# Patient Record
Sex: MALE
[De-identification: names, ages, dates, MRNs, and addresses within clinical notes are randomized per-mention and may not be internally consistent; named-entity substitution may affect disease eponyms.]

---

## 2017-05-31 PROBLEM — Z00.00 ENCOUNTER FOR PREVENTIVE HEALTH EXAMINATION: Status: ACTIVE | Noted: 2017-05-31

## 2017-06-01 ENCOUNTER — APPOINTMENT (OUTPATIENT)
Dept: GASTROENTEROLOGY | Facility: CLINIC | Age: 32
End: 2017-06-01

## 2017-06-01 ENCOUNTER — APPOINTMENT (OUTPATIENT)
Dept: SURGERY | Facility: CLINIC | Age: 32
End: 2017-06-01

## 2017-06-01 ENCOUNTER — INPATIENT (INPATIENT)
Facility: HOSPITAL | Age: 32
LOS: 1 days | Discharge: HOME | DRG: 331 | End: 2017-06-03
Payer: COMMERCIAL

## 2017-06-01 VITALS
SYSTOLIC BLOOD PRESSURE: 115 MMHG | OXYGEN SATURATION: 98 % | DIASTOLIC BLOOD PRESSURE: 72 MMHG | RESPIRATION RATE: 18 BRPM | HEART RATE: 78 BPM | TEMPERATURE: 99 F

## 2017-06-01 VITALS
DIASTOLIC BLOOD PRESSURE: 82 MMHG | TEMPERATURE: 98.9 F | SYSTOLIC BLOOD PRESSURE: 126 MMHG | WEIGHT: 186 LBS | OXYGEN SATURATION: 98 % | BODY MASS INDEX: 28.19 KG/M2 | HEIGHT: 68 IN | HEART RATE: 93 BPM

## 2017-06-01 VITALS
RESPIRATION RATE: 16 BRPM | HEIGHT: 68 IN | OXYGEN SATURATION: 99 % | DIASTOLIC BLOOD PRESSURE: 83 MMHG | SYSTOLIC BLOOD PRESSURE: 123 MMHG | TEMPERATURE: 98.5 F | BODY MASS INDEX: 28.19 KG/M2 | WEIGHT: 186 LBS | HEART RATE: 87 BPM

## 2017-06-01 DIAGNOSIS — K59.00 CONSTIPATION, UNSPECIFIED: ICD-10-CM

## 2017-06-01 DIAGNOSIS — Z98.890 OTHER SPECIFIED POSTPROCEDURAL STATES: Chronic | ICD-10-CM

## 2017-06-01 DIAGNOSIS — Z86.79 PERSONAL HISTORY OF OTHER DISEASES OF THE CIRCULATORY SYSTEM: ICD-10-CM

## 2017-06-01 DIAGNOSIS — S83.519A SPRAIN OF ANTERIOR CRUCIATE LIGAMENT OF UNSPECIFIED KNEE, INITIAL ENCOUNTER: ICD-10-CM

## 2017-06-01 DIAGNOSIS — Z87.448 PERSONAL HISTORY OF OTHER DISEASES OF URINARY SYSTEM: ICD-10-CM

## 2017-06-01 DIAGNOSIS — M54.9 DORSALGIA, UNSPECIFIED: ICD-10-CM

## 2017-06-01 DIAGNOSIS — Z80.1 FAMILY HISTORY OF MALIGNANT NEOPLASM OF TRACHEA, BRONCHUS AND LUNG: ICD-10-CM

## 2017-06-01 DIAGNOSIS — K62.89 OTHER SPECIFIED DISEASES OF ANUS AND RECTUM: ICD-10-CM

## 2017-06-01 DIAGNOSIS — K62.5 HEMORRHAGE OF ANUS AND RECTUM: ICD-10-CM

## 2017-06-01 DIAGNOSIS — Z82.49 FAMILY HISTORY OF ISCHEMIC HEART DISEASE AND OTHER DISEASES OF THE CIRCULATORY SYSTEM: ICD-10-CM

## 2017-06-01 DIAGNOSIS — R12 HEARTBURN: ICD-10-CM

## 2017-06-01 DIAGNOSIS — K64.4 RESIDUAL HEMORRHOIDAL SKIN TAGS: ICD-10-CM

## 2017-06-01 DIAGNOSIS — K64.9 UNSPECIFIED HEMORRHOIDS: ICD-10-CM

## 2017-06-01 DIAGNOSIS — Z78.9 OTHER SPECIFIED HEALTH STATUS: ICD-10-CM

## 2017-06-01 DIAGNOSIS — K64.5 PERIANAL VENOUS THROMBOSIS: ICD-10-CM

## 2017-06-01 DIAGNOSIS — K40.90 UNILATERAL INGUINAL HERNIA, W/OUT OBSTRUCTION OR GANGRENE, NOT SPECIFIED AS RECURRENT: ICD-10-CM

## 2017-06-01 LAB
ALBUMIN SERPL ELPH-MCNC: 3.8 G/DL — SIGNIFICANT CHANGE UP (ref 3.3–5)
ALP SERPL-CCNC: 44 U/L — SIGNIFICANT CHANGE UP (ref 40–120)
ALT FLD-CCNC: 17 U/L RC — SIGNIFICANT CHANGE UP (ref 10–45)
ANION GAP SERPL CALC-SCNC: 12 MMOL/L — SIGNIFICANT CHANGE UP (ref 5–17)
APTT BLD: 27.1 SEC — LOW (ref 27.5–37.4)
AST SERPL-CCNC: 33 U/L — SIGNIFICANT CHANGE UP (ref 10–40)
BASOPHILS # BLD AUTO: 0 K/UL — SIGNIFICANT CHANGE UP (ref 0–0.2)
BASOPHILS NFR BLD AUTO: 0.6 % — SIGNIFICANT CHANGE UP (ref 0–2)
BILIRUB SERPL-MCNC: 1.6 MG/DL — HIGH (ref 0.2–1.2)
BLD GP AB SCN SERPL QL: NEGATIVE — SIGNIFICANT CHANGE UP
BUN SERPL-MCNC: 23 MG/DL — SIGNIFICANT CHANGE UP (ref 7–23)
CALCIUM SERPL-MCNC: 9.1 MG/DL — SIGNIFICANT CHANGE UP (ref 8.4–10.5)
CHLORIDE SERPL-SCNC: 103 MMOL/L — SIGNIFICANT CHANGE UP (ref 96–108)
CO2 SERPL-SCNC: 26 MMOL/L — SIGNIFICANT CHANGE UP (ref 22–31)
CREAT SERPL-MCNC: 1.08 MG/DL — SIGNIFICANT CHANGE UP (ref 0.5–1.3)
EOSINOPHIL # BLD AUTO: 0.1 K/UL — SIGNIFICANT CHANGE UP (ref 0–0.5)
EOSINOPHIL NFR BLD AUTO: 1.9 % — SIGNIFICANT CHANGE UP (ref 0–6)
GLUCOSE SERPL-MCNC: 139 MG/DL — HIGH (ref 70–99)
HCT VFR BLD CALC: 40.4 % — SIGNIFICANT CHANGE UP (ref 39–50)
HGB BLD-MCNC: 13.3 G/DL — SIGNIFICANT CHANGE UP (ref 13–17)
INR BLD: 1.14 RATIO — SIGNIFICANT CHANGE UP (ref 0.88–1.16)
LYMPHOCYTES # BLD AUTO: 2.2 K/UL — SIGNIFICANT CHANGE UP (ref 1–3.3)
LYMPHOCYTES # BLD AUTO: 36 % — SIGNIFICANT CHANGE UP (ref 13–44)
MCHC RBC-ENTMCNC: 32.1 PG — SIGNIFICANT CHANGE UP (ref 27–34)
MCHC RBC-ENTMCNC: 33 GM/DL — SIGNIFICANT CHANGE UP (ref 32–36)
MCV RBC AUTO: 97.4 FL — SIGNIFICANT CHANGE UP (ref 80–100)
MONOCYTES # BLD AUTO: 0.4 K/UL — SIGNIFICANT CHANGE UP (ref 0–0.9)
MONOCYTES NFR BLD AUTO: 7.4 % — SIGNIFICANT CHANGE UP (ref 2–14)
NEUTROPHILS # BLD AUTO: 3.3 K/UL — SIGNIFICANT CHANGE UP (ref 1.8–7.4)
NEUTROPHILS NFR BLD AUTO: 54.2 % — SIGNIFICANT CHANGE UP (ref 43–77)
PLATELET # BLD AUTO: 156 K/UL — SIGNIFICANT CHANGE UP (ref 150–400)
POTASSIUM SERPL-MCNC: 3.7 MMOL/L — SIGNIFICANT CHANGE UP (ref 3.5–5.3)
POTASSIUM SERPL-SCNC: 3.7 MMOL/L — SIGNIFICANT CHANGE UP (ref 3.5–5.3)
PROT SERPL-MCNC: 6.2 G/DL — SIGNIFICANT CHANGE UP (ref 6–8.3)
PROTHROM AB SERPL-ACNC: 12.3 SEC — SIGNIFICANT CHANGE UP (ref 9.8–12.7)
RBC # BLD: 4.14 M/UL — LOW (ref 4.2–5.8)
RBC # FLD: 12.2 % — SIGNIFICANT CHANGE UP (ref 10.3–14.5)
RH IG SCN BLD-IMP: POSITIVE — SIGNIFICANT CHANGE UP
SODIUM SERPL-SCNC: 141 MMOL/L — SIGNIFICANT CHANGE UP (ref 135–145)
WBC # BLD: 6 K/UL — SIGNIFICANT CHANGE UP (ref 3.8–10.5)
WBC # FLD AUTO: 6 K/UL — SIGNIFICANT CHANGE UP (ref 3.8–10.5)

## 2017-06-01 PROCEDURE — 93010 ELECTROCARDIOGRAM REPORT: CPT | Mod: NC

## 2017-06-01 PROCEDURE — 99285 EMERGENCY DEPT VISIT HI MDM: CPT | Mod: 25

## 2017-06-01 PROCEDURE — 71020: CPT | Mod: 26

## 2017-06-01 RX ORDER — SODIUM CHLORIDE 9 MG/ML
1000 INJECTION INTRAMUSCULAR; INTRAVENOUS; SUBCUTANEOUS ONCE
Qty: 0 | Refills: 0 | Status: COMPLETED | OUTPATIENT
Start: 2017-06-01 | End: 2017-06-01

## 2017-06-01 RX ORDER — MORPHINE SULFATE 50 MG/1
4 CAPSULE, EXTENDED RELEASE ORAL EVERY 4 HOURS
Qty: 0 | Refills: 0 | Status: DISCONTINUED | OUTPATIENT
Start: 2017-06-01 | End: 2017-06-02

## 2017-06-01 RX ORDER — ENOXAPARIN SODIUM 100 MG/ML
40 INJECTION SUBCUTANEOUS DAILY
Qty: 0 | Refills: 0 | Status: DISCONTINUED | OUTPATIENT
Start: 2017-06-01 | End: 2017-06-02

## 2017-06-01 RX ORDER — SODIUM CHLORIDE 9 MG/ML
1000 INJECTION INTRAMUSCULAR; INTRAVENOUS; SUBCUTANEOUS
Qty: 0 | Refills: 0 | Status: DISCONTINUED | OUTPATIENT
Start: 2017-06-01 | End: 2017-06-01

## 2017-06-01 RX ORDER — SODIUM CHLORIDE 9 MG/ML
1000 INJECTION, SOLUTION INTRAVENOUS
Qty: 0 | Refills: 0 | Status: DISCONTINUED | OUTPATIENT
Start: 2017-06-01 | End: 2017-06-02

## 2017-06-01 RX ORDER — MORPHINE SULFATE 50 MG/1
4 CAPSULE, EXTENDED RELEASE ORAL ONCE
Qty: 0 | Refills: 0 | Status: DISCONTINUED | OUTPATIENT
Start: 2017-06-01 | End: 2017-06-01

## 2017-06-01 RX ORDER — ACETAMINOPHEN 500 MG
1000 TABLET ORAL ONCE
Qty: 0 | Refills: 0 | Status: COMPLETED | OUTPATIENT
Start: 2017-06-01 | End: 2017-06-01

## 2017-06-01 RX ORDER — POTASSIUM CHLORIDE 20 MEQ
40 PACKET (EA) ORAL ONCE
Qty: 0 | Refills: 0 | Status: COMPLETED | OUTPATIENT
Start: 2017-06-01 | End: 2017-06-01

## 2017-06-01 RX ORDER — KETOROLAC TROMETHAMINE 30 MG/ML
15 SYRINGE (ML) INJECTION ONCE
Qty: 0 | Refills: 0 | Status: DISCONTINUED | OUTPATIENT
Start: 2017-06-01 | End: 2017-06-01

## 2017-06-01 RX ORDER — DOCUSATE SODIUM 100 MG/1
100 CAPSULE ORAL
Refills: 0 | Status: ACTIVE | COMMUNITY

## 2017-06-01 RX ORDER — SENNOSIDES 8.6 MG/1
CAPSULE, GELATIN COATED ORAL
Refills: 0 | Status: ACTIVE | COMMUNITY

## 2017-06-01 RX ORDER — SOD SULF/SODIUM/NAHCO3/KCL/PEG
4000 SOLUTION, RECONSTITUTED, ORAL ORAL ONCE
Qty: 0 | Refills: 0 | Status: COMPLETED | OUTPATIENT
Start: 2017-06-01 | End: 2017-06-02

## 2017-06-01 RX ORDER — MORPHINE SULFATE 50 MG/1
4 CAPSULE, EXTENDED RELEASE ORAL EVERY 4 HOURS
Qty: 0 | Refills: 0 | Status: DISCONTINUED | OUTPATIENT
Start: 2017-06-01 | End: 2017-06-01

## 2017-06-01 RX ORDER — OMEPRAZOLE 20 MG/1
20 CAPSULE, DELAYED RELEASE ORAL
Refills: 0 | Status: ACTIVE | COMMUNITY

## 2017-06-01 RX ORDER — PANTOPRAZOLE SODIUM 20 MG/1
40 TABLET, DELAYED RELEASE ORAL
Qty: 0 | Refills: 0 | Status: DISCONTINUED | OUTPATIENT
Start: 2017-06-01 | End: 2017-06-02

## 2017-06-01 RX ORDER — MORPHINE SULFATE 50 MG/1
2 CAPSULE, EXTENDED RELEASE ORAL EVERY 4 HOURS
Qty: 0 | Refills: 0 | Status: DISCONTINUED | OUTPATIENT
Start: 2017-06-01 | End: 2017-06-02

## 2017-06-01 RX ADMIN — MORPHINE SULFATE 4 MILLIGRAM(S): 50 CAPSULE, EXTENDED RELEASE ORAL at 21:33

## 2017-06-01 RX ADMIN — Medication 15 MILLIGRAM(S): at 23:09

## 2017-06-01 RX ADMIN — Medication 1000 MILLIGRAM(S): at 23:40

## 2017-06-01 RX ADMIN — MORPHINE SULFATE 4 MILLIGRAM(S): 50 CAPSULE, EXTENDED RELEASE ORAL at 22:24

## 2017-06-01 RX ADMIN — SODIUM CHLORIDE 125 MILLILITER(S): 9 INJECTION, SOLUTION INTRAVENOUS at 23:10

## 2017-06-01 RX ADMIN — Medication 40 MILLIEQUIVALENT(S): at 23:59

## 2017-06-01 RX ADMIN — SODIUM CHLORIDE 1000 MILLILITER(S): 9 INJECTION INTRAMUSCULAR; INTRAVENOUS; SUBCUTANEOUS at 21:33

## 2017-06-01 RX ADMIN — Medication 400 MILLIGRAM(S): at 23:10

## 2017-06-01 RX ADMIN — Medication 15 MILLIGRAM(S): at 23:40

## 2017-06-01 NOTE — ED PROVIDER NOTE - ATTENDING CONTRIBUTION TO CARE
Patient with severe rectal pain and constipation. Moderate. persistent. Not better with outpatient therapy. Sent in by surgery for admission and pain control.  Severely engorged hemorrhoids unable to discern wether Patient with severe rectal pain and constipation. Moderate. persistent. Not better with outpatient therapy. Sent in by surgery for admission and pain control.  Severely engorged hemorrhoids unable to discern wether internal or external. Moderate to severe discomfort. Cooperative, pleasant, ncat, ctab, non-tachycardic, non-tachypneic, no edema, soft, ntnd, alert  will get iv, labs, surgery consult and admit.

## 2017-06-01 NOTE — ED PROVIDER NOTE - OBJECTIVE STATEMENT
otherwise healthy 33 y/o male w ho presents for admission. patient seen by Dr. Ashok Manzano today in the office for hemorrhoids. reports long hx of this problem, has been constipated and has had acute worsening for about 5 days. very painful, bleeds frequently. patient was sent in for surgery.

## 2017-06-01 NOTE — H&P ADULT. - ASSESSMENT
32M prolapsed and thrombosed external hemorrhoids, constipation  - Admit to [facility], Dr. Manzano  - CLD, NPO past 8am  - IVF at 125cc/hr  - 1 gallon golytely to be administered overnight for bowel prep  - Pain control PRN

## 2017-06-01 NOTE — ED PROVIDER NOTE - CARE PLAN
Principal Discharge DX:	Hemorrhoid Principal Discharge DX:	Hemorrhoid  Secondary Diagnosis:	Rectal pain

## 2017-06-01 NOTE — ED ADULT NURSE NOTE - OBJECTIVE STATEMENT
32 y m was sent to the ed by pcp for presurgical testing. patient states he is scheduled for hemorrhoid surgery tomorrow so was told to come to the ed for pain control and blood work. patient c/o rectal pain 8/10 but denies taking any medication prior to coming to the ed. denies any fevers, chills, chest pain, sob. abdomen is soft and nontender. denies n/v/d. skin is warm and dry.

## 2017-06-01 NOTE — ED CLERICAL - NS ED CLERK NOTE PRE-ARRIVAL INFORMATION; ADDITIONAL PRE-ARRIVAL INFORMATION
3 quad Hemorid green surgery npo/iv here for admission to be operated on tomorrow // dr napier has already called surgery resident

## 2017-06-01 NOTE — H&P ADULT. - HISTORY OF PRESENT ILLNESS
32M h/o hemorrhoids sent in from outpatient office for severe pain related to prolapsed hemorrhoids. Patient notes that he was straining to have a bowel movement 5d ago when he developed pain and swelling of the rectum/anus, has not had a bowel movement since. Patient was seen by Dr. Manzano in the office where he was found to have 3 quadrant hemorrhoid prolapse and thrombosis. He was sent to [facility] ED for hydration, pain control, and bowel regimen prior to hemorrhoidectomy planning.  Patient's PMH significant for intermittent prolapse with BM, GERD. Surgical history includes inguinal hernia repair, knee arthroscopy for torn ACL, vein stripping for varicose veins. His medications include omeprazole and senna/colace.  On exam the patient was afebrile with stable vital signs. He was uncomfortable in appearance. His cardiopulmonary and abdominal exams were unremarkable. Large external hemorrhoids were noted, further rectal exam deferred 2/2 patient discomfort and attending request.   Laboratory values showed WBC 6.0, BMP wnl. T bili elevated to 1.6, will repeat in AM. CXR in ED showed clear lungs. 32M h/o hemorrhoids sent in from outpatient office for severe pain related to prolapsed hemorrhoids. Patient notes that he was straining to have a bowel movement 5d ago when he developed pain and swelling of the rectum/anus, has not had a bowel movement for 7 days.  Normally, has daily BM.  Patient was seen by Dr. Manzano in the office where he was found to have 3 quadrant hemorrhoid prolapse and thrombosis. He was sent to [facility] ED for hydration, pain control, and bowel regimen prior to hemorrhoidectomy planning.  Patient's PMH significant for intermittent prolapse with BM, GERD. Surgical history includes inguinal hernia repair, knee arthroscopy for torn ACL, vein stripping for varicose veins. His medications include omeprazole and senna/colace.  On exam the patient was afebrile with stable vital signs. He was uncomfortable in appearance. His cardiopulmonary and abdominal exams were unremarkable. Large external hemorrhoids were noted, further rectal exam deferred 2/2 patient discomfort and attending request.   Laboratory values showed WBC 6.0, BMP wnl. T bili elevated to 1.6, will repeat in AM. CXR in ED showed clear lungs.

## 2017-06-02 ENCOUNTER — TRANSCRIPTION ENCOUNTER (OUTPATIENT)
Age: 32
End: 2017-06-02

## 2017-06-02 ENCOUNTER — RESULT REVIEW (OUTPATIENT)
Age: 32
End: 2017-06-02

## 2017-06-02 LAB
ALBUMIN SERPL ELPH-MCNC: 3.8 G/DL — SIGNIFICANT CHANGE UP (ref 3.3–5)
ALP SERPL-CCNC: 42 U/L — SIGNIFICANT CHANGE UP (ref 40–120)
ALT FLD-CCNC: 18 U/L RC — SIGNIFICANT CHANGE UP (ref 10–45)
ANION GAP SERPL CALC-SCNC: 11 MMOL/L — SIGNIFICANT CHANGE UP (ref 5–17)
APPEARANCE UR: CLEAR — SIGNIFICANT CHANGE UP
APTT BLD: 26.4 SEC — LOW (ref 27.5–37.4)
AST SERPL-CCNC: 30 U/L — SIGNIFICANT CHANGE UP (ref 10–40)
BILIRUB DIRECT SERPL-MCNC: 0.3 MG/DL — HIGH (ref 0–0.2)
BILIRUB INDIRECT FLD-MCNC: 1.4 MG/DL — HIGH (ref 0.2–1)
BILIRUB SERPL-MCNC: 1.7 MG/DL — HIGH (ref 0.2–1.2)
BILIRUB UR-MCNC: NEGATIVE — SIGNIFICANT CHANGE UP
BLD GP AB SCN SERPL QL: NEGATIVE — SIGNIFICANT CHANGE UP
BUN SERPL-MCNC: 18 MG/DL — SIGNIFICANT CHANGE UP (ref 7–23)
CALCIUM SERPL-MCNC: 8.5 MG/DL — SIGNIFICANT CHANGE UP (ref 8.4–10.5)
CHLORIDE SERPL-SCNC: 103 MMOL/L — SIGNIFICANT CHANGE UP (ref 96–108)
CO2 SERPL-SCNC: 27 MMOL/L — SIGNIFICANT CHANGE UP (ref 22–31)
COLOR SPEC: YELLOW — SIGNIFICANT CHANGE UP
CREAT SERPL-MCNC: 1 MG/DL — SIGNIFICANT CHANGE UP (ref 0.5–1.3)
DIFF PNL FLD: NEGATIVE — SIGNIFICANT CHANGE UP
GLUCOSE SERPL-MCNC: 88 MG/DL — SIGNIFICANT CHANGE UP (ref 70–99)
GLUCOSE UR QL: NEGATIVE — SIGNIFICANT CHANGE UP
HCT VFR BLD CALC: 40.3 % — SIGNIFICANT CHANGE UP (ref 39–50)
HGB BLD-MCNC: 13.2 G/DL — SIGNIFICANT CHANGE UP (ref 13–17)
INR BLD: 1.1 RATIO — SIGNIFICANT CHANGE UP (ref 0.88–1.16)
KETONES UR-MCNC: ABNORMAL
LEUKOCYTE ESTERASE UR-ACNC: NEGATIVE — SIGNIFICANT CHANGE UP
MAGNESIUM SERPL-MCNC: 2.1 MG/DL — SIGNIFICANT CHANGE UP (ref 1.6–2.6)
MCHC RBC-ENTMCNC: 32.4 PG — SIGNIFICANT CHANGE UP (ref 27–34)
MCHC RBC-ENTMCNC: 32.8 GM/DL — SIGNIFICANT CHANGE UP (ref 32–36)
MCV RBC AUTO: 98.9 FL — SIGNIFICANT CHANGE UP (ref 80–100)
NITRITE UR-MCNC: NEGATIVE — SIGNIFICANT CHANGE UP
PH UR: 6.5 — SIGNIFICANT CHANGE UP (ref 5–8)
PHOSPHATE SERPL-MCNC: 3.3 MG/DL — SIGNIFICANT CHANGE UP (ref 2.5–4.5)
PLATELET # BLD AUTO: 163 K/UL — SIGNIFICANT CHANGE UP (ref 150–400)
POTASSIUM SERPL-MCNC: 4.2 MMOL/L — SIGNIFICANT CHANGE UP (ref 3.5–5.3)
POTASSIUM SERPL-SCNC: 4.2 MMOL/L — SIGNIFICANT CHANGE UP (ref 3.5–5.3)
PROT SERPL-MCNC: 6.1 G/DL — SIGNIFICANT CHANGE UP (ref 6–8.3)
PROT UR-MCNC: NEGATIVE — SIGNIFICANT CHANGE UP
PROTHROM AB SERPL-ACNC: 11.9 SEC — SIGNIFICANT CHANGE UP (ref 9.8–12.7)
RBC # BLD: 4.07 M/UL — LOW (ref 4.2–5.8)
RBC # FLD: 12.1 % — SIGNIFICANT CHANGE UP (ref 10.3–14.5)
RH IG SCN BLD-IMP: POSITIVE — SIGNIFICANT CHANGE UP
SODIUM SERPL-SCNC: 141 MMOL/L — SIGNIFICANT CHANGE UP (ref 135–145)
SP GR SPEC: 1.02 — SIGNIFICANT CHANGE UP (ref 1.01–1.02)
UROBILINOGEN FLD QL: NEGATIVE — SIGNIFICANT CHANGE UP
WBC # BLD: 4.5 K/UL — SIGNIFICANT CHANGE UP (ref 3.8–10.5)
WBC # FLD AUTO: 4.5 K/UL — SIGNIFICANT CHANGE UP (ref 3.8–10.5)

## 2017-06-02 PROCEDURE — 88304 TISSUE EXAM BY PATHOLOGIST: CPT | Mod: 26

## 2017-06-02 RX ORDER — OXYCODONE HYDROCHLORIDE 5 MG/1
1 TABLET ORAL
Qty: 30 | Refills: 0 | OUTPATIENT
Start: 2017-06-02

## 2017-06-02 RX ORDER — ONDANSETRON 8 MG/1
4 TABLET, FILM COATED ORAL ONCE
Qty: 0 | Refills: 0 | Status: COMPLETED | OUTPATIENT
Start: 2017-06-02 | End: 2017-06-02

## 2017-06-02 RX ORDER — ACETAMINOPHEN 500 MG
1000 TABLET ORAL ONCE
Qty: 0 | Refills: 0 | Status: COMPLETED | OUTPATIENT
Start: 2017-06-02 | End: 2017-06-02

## 2017-06-02 RX ORDER — HYDROMORPHONE HYDROCHLORIDE 2 MG/ML
0.5 INJECTION INTRAMUSCULAR; INTRAVENOUS; SUBCUTANEOUS
Qty: 0 | Refills: 0 | Status: DISCONTINUED | OUTPATIENT
Start: 2017-06-02 | End: 2017-06-02

## 2017-06-02 RX ADMIN — Medication 1000 MILLIGRAM(S): at 18:56

## 2017-06-02 RX ADMIN — ONDANSETRON 4 MILLIGRAM(S): 8 TABLET, FILM COATED ORAL at 02:13

## 2017-06-02 RX ADMIN — Medication 400 MILLIGRAM(S): at 18:22

## 2017-06-02 RX ADMIN — PANTOPRAZOLE SODIUM 40 MILLIGRAM(S): 20 TABLET, DELAYED RELEASE ORAL at 05:04

## 2017-06-02 RX ADMIN — Medication 4000 MILLILITER(S): at 00:04

## 2017-06-02 NOTE — DISCHARGE NOTE ADULT - CONDITIONS AT DISCHARGE
pt a/ox4, VSS. Ambulating independently, tolerating diet and voiding w/o difficulty. Surgical incision remains c/d/i, sitz bath given x1. Pain control provided w prescribed medications, IV site remained WNL until removal upon d.c. D/c instructions provided to pt at bedside, understanding verbalized. Ready to d/c home.

## 2017-06-02 NOTE — DISCHARGE NOTE ADULT - PLAN OF CARE
Ambulatory procedure. Please go directly to ambulatory surgery center. Recover from surgery Take sitz baths as instructed. You have been prescribed percocet for pain and metronidazole gel to be applied to anus after every bath. See attached post-operative instruction sheet for further information. Please follow up with Dr. Manzano within 1-2 weeks after discharge from the hospital. You may call [phone] to schedule an appointment.

## 2017-06-02 NOTE — DISCHARGE NOTE ADULT - PATIENT PORTAL LINK FT
“You can access the Follow[facility] Patient Portal, offered by [facility], by registering with the following website: [email or web address]”

## 2017-06-02 NOTE — DISCHARGE NOTE ADULT - CARE PROVIDER_API CALL
Ashko Manzano), ColonRectal Surgery; Surgery  [address] [place] [address]  Phone: [phone]  Fax: [phone]

## 2017-06-02 NOTE — DISCHARGE NOTE ADULT - INSTRUCTIONS
NPO for procedure at ambulatory surgery  You will need pain control with medications following surgery Regular diet

## 2017-06-02 NOTE — DISCHARGE NOTE ADULT - HOSPITAL COURSE
32M h/o hemorrhoids sent in from outpatient office for severe pain related to prolapsed hemorrhoids. Patient notes that he was straining to have a bowel movement 5d ago when he developed pain and swelling of the rectum/anus, has not had a bowel movement for 7 days.  Normally, has daily BM.  Patient was seen by Dr. Manzano in the office where he was found to have 3 quadrant hemorrhoid prolapse and thrombosis. He was sent to [facility] ED for hydration, pain control, and bowel regimen prior to hemorrhoidectomy planning.  Patient's PMH significant for intermittent prolapse with BM, GERD. Surgical history includes inguinal hernia repair, knee arthroscopy for torn ACL, vein stripping for varicose veins. His medications include omeprazole and senna/colace.  On exam the patient was afebrile with stable vital signs. He was uncomfortable in appearance. His cardiopulmonary and abdominal exams were unremarkable. Large external hemorrhoids were noted, further rectal exam deferred 2/2 patient discomfort and attending request.   Laboratory values showed WBC 6.0, BMP wnl. T bili elevated to 1.6, will repeat in AM. CXR in ED showed clear lungs.    Patient was given clear diet and pain medications. He complete most of his bowel prep. He was made NPO at 8AM and instructed to head to ambulatory surgery for planned procedure with Dr. Manzano. Patient hemodynamically stable and cleared to be discharged for procedure. 32M h/o hemorrhoids sent in from outpatient office for severe pain related to prolapsed hemorrhoids. Patient notes that he was straining to have a bowel movement 5d ago when he developed pain and swelling of the rectum/anus, has not had a bowel movement for 7 days.  Normally, has daily BM.  Patient was seen by Dr. Manzano in the office where he was found to have 3 quadrant hemorrhoid prolapse and thrombosis. He was sent to [facility] ED for hydration, pain control, and bowel regimen prior to hemorrhoidectomy planning.  Patient's PMH significant for intermittent prolapse with BM, GERD. Surgical history includes inguinal hernia repair, knee arthroscopy for torn ACL, vein stripping for varicose veins. His medications include omeprazole and senna/colace.  On exam the patient was afebrile with stable vital signs. He was uncomfortable in appearance. His cardiopulmonary and abdominal exams were unremarkable. Large external hemorrhoids were noted, further rectal exam deferred 2/2 patient discomfort and attending request.   Laboratory values showed WBC 6.0, BMP wnl. T bili elevated to 1.6, will repeat in AM. CXR in ED showed clear lungs.    Patient was given clear diet and pain medications. He complete most of his bowel prep. He was made NPO at 8AM for OR. 32M h/o hemorrhoids sent in from outpatient office for severe pain related to prolapsed hemorrhoids. Patient notes that he was straining to have a bowel movement 5d ago when he developed pain and swelling of the rectum/anus, has not had a bowel movement for 7 days.  Normally, has daily BM.  Patient was seen by Dr. Manzano in the office where he was found to have 3 quadrant hemorrhoid prolapse and thrombosis. He was sent to [facility] ED for hydration, pain control, and bowel regimen prior to hemorrhoidectomy planning.  Patient's PMH significant for intermittent prolapse with BM, GERD. Surgical history includes inguinal hernia repair, knee arthroscopy for torn ACL, vein stripping for varicose veins. His medications include omeprazole and senna/colace.  On exam the patient was afebrile with stable vital signs. He was uncomfortable in appearance. His cardiopulmonary and abdominal exams were unremarkable. Large external hemorrhoids were noted, further rectal exam deferred 2/2 patient discomfort and attending request.   Laboratory values showed WBC 6.0, BMP wnl. T bili elevated to 1.6, will repeat in AM. CXR in ED showed clear lungs.    Patient was given clear diet and pain medications. He complete most of his bowel prep. He was made NPO at 8AM for OR.     Patient went to OR emergently and underwent three quadrant hemorrhoidectomy. The patient tolerated the procedure well. There were no complications. The patient was extubated in the OR. The patient was transferred to the PACU in stable condition then transferred to the floor. Patient was subsequently discharged without issue, with pain controlled, tolerating diet and voiding.

## 2017-06-02 NOTE — DISCHARGE NOTE ADULT - ADDITIONAL INSTRUCTIONS
Dr. Manzano Please follow up with Dr. Manzano within 1-2 weeks after discharge from the hospital. You may call [phone] to schedule an appointment.

## 2017-06-02 NOTE — DISCHARGE NOTE ADULT - MEDICATION SUMMARY - MEDICATIONS TO TAKE
I will START or STAY ON the medications listed below when I get home from the hospital:    senna  -- tab(s) by mouth once a day  -- Indication: For Constipation    Colace  -- tab(s) by mouth once a day  -- Indication: For Constipation    omeprazole 20 mg oral delayed release tablet  -- 1 tab(s) by mouth once a day  -- Indication: For Reflux I will START or STAY ON the medications listed below when I get home from the hospital:    acetaminophen-oxycodone 325 mg-5 mg oral tablet  -- 1 or 2 tab(s) by mouth every 4 to 6 hours, As needed, Moderate to Severe Pain MDD:8 tablets  -- Indication: For Pain control    MetroGel 1% topical gel  -- Apply on skin to anus after every sitz bath  -- For external use only.    -- Indication: For Hemorrhoids    senna  -- tab(s) by mouth once a day  -- Indication: For Constipation    Colace  -- tab(s) by mouth once a day  -- Indication: For Constipation    omeprazole 20 mg oral delayed release tablet  -- 1 tab(s) by mouth once a day  -- Indication: For Reflux I will START or STAY ON the medications listed below when I get home from the hospital:    acetaminophen-oxycodone 325 mg-5 mg oral tablet  -- 1 or 2 tab(s) by mouth every 4 to 6 hours, As needed, Moderate to Severe Pain MDD:8 tablets  -- Indication: For moderate to severe pain    MetroGel 1% topical gel  -- Apply on skin to anus after every sitz bath  -- For external use only.    -- Indication: For Hemorrhoids    senna  -- tab(s) by mouth once a day  -- Indication: For Constipation    Colace  -- tab(s) by mouth once a day  -- Indication: For Constipation    omeprazole 20 mg oral delayed release tablet  -- 1 tab(s) by mouth once a day  -- Indication: For Reflux I will START or STAY ON the medications listed below when I get home from the hospital:    Tylenol with Codeine #3 oral tablet  -- 1 tab(s) by mouth every 6 hours MDD:5 tabs  -- Caution federal law prohibits the transfer of this drug to any person other  than the person for whom it was prescribed.  May cause drowsiness.  Alcohol may intensify this effect.  Use care when operating dangerous machinery.  This product contains acetaminophen.  Do not use  with any other product containing acetaminophen to prevent possible liver damage.  Using more of this medication than prescribed may cause serious breathing problems.    -- Indication: For Pain    MetroGel 1% topical gel  -- Apply on skin to anus after every sitz bath  -- For external use only.    -- Indication: For Hemorrhoids    senna  -- tab(s) by mouth once a day  -- Indication: For Constipation    Colace  -- tab(s) by mouth once a day  -- Indication: For Constipation    omeprazole 20 mg oral delayed release tablet  -- 1 tab(s) by mouth once a day  -- Indication: For Reflux

## 2017-06-02 NOTE — DISCHARGE NOTE ADULT - CARE PLAN
Principal Discharge DX:	Other hemorrhoids  Goal:	Ambulatory procedure.  Instructions for follow-up, activity and diet:	Please go directly to ambulatory surgery center. Principal Discharge DX:	Other hemorrhoids Principal Discharge DX:	Other hemorrhoids  Goal:	Recover from surgery  Instructions for follow-up, activity and diet:	Take sitz baths as instructed. You have been prescribed percocet for pain and metronidazole gel to be applied to anus after every bath. See attached post-operative instruction sheet for further information. Please follow up with Dr. Manzano within 1-2 weeks after discharge from the hospital. You may call [phone] to schedule an appointment. Principal Discharge DX:	Other hemorrhoids  Goal:	Recover from surgery  Instructions for follow-up, activity and diet:	Take sitz baths as instructed. You have been prescribed percocet for pain and metronidazole gel to be applied to anus after every bath. See attached post-operative instruction sheet for further information. Please follow up with Dr. Manzano within 1-2 weeks after discharge from the hospital. You may call [phone] to schedule an appointment. Principal Discharge DX:	Other hemorrhoids  Goal:	Recover from surgery  Instructions for follow-up, activity and diet:	Take sitz baths as instructed. You have been prescribed percocet for pain and metronidazole gel to be applied to anus after every bath. See attached post-operative instruction sheet for further information. Please follow up with Dr. Manzano within 1-2 weeks after discharge from the hospital. You may call [phone] to schedule an appointment. Principal Discharge DX:	Other hemorrhoids  Goal:	Recover from surgery  Instructions for follow-up, activity and diet:	Take sitz baths as instructed. You have been prescribed percocet for pain and metronidazole gel to be applied to anus after every bath. See attached post-operative instruction sheet for further information. Please follow up with Dr. Manzano within 1-2 weeks after discharge from the hospital. You may call [phone] to schedule an appointment.

## 2017-06-03 VITALS
SYSTOLIC BLOOD PRESSURE: 130 MMHG | TEMPERATURE: 98 F | HEART RATE: 80 BPM | DIASTOLIC BLOOD PRESSURE: 75 MMHG | RESPIRATION RATE: 18 BRPM | OXYGEN SATURATION: 93 %

## 2017-06-03 PROCEDURE — 80048 BASIC METABOLIC PNL TOTAL CA: CPT

## 2017-06-03 PROCEDURE — C1889: CPT

## 2017-06-03 PROCEDURE — 93005 ELECTROCARDIOGRAM TRACING: CPT

## 2017-06-03 PROCEDURE — 80053 COMPREHEN METABOLIC PANEL: CPT

## 2017-06-03 PROCEDURE — 96374 THER/PROPH/DIAG INJ IV PUSH: CPT

## 2017-06-03 PROCEDURE — 71046 X-RAY EXAM CHEST 2 VIEWS: CPT

## 2017-06-03 PROCEDURE — 85027 COMPLETE CBC AUTOMATED: CPT

## 2017-06-03 PROCEDURE — 80076 HEPATIC FUNCTION PANEL: CPT

## 2017-06-03 PROCEDURE — 88304 TISSUE EXAM BY PATHOLOGIST: CPT

## 2017-06-03 PROCEDURE — 83735 ASSAY OF MAGNESIUM: CPT

## 2017-06-03 PROCEDURE — 85730 THROMBOPLASTIN TIME PARTIAL: CPT

## 2017-06-03 PROCEDURE — 86901 BLOOD TYPING SEROLOGIC RH(D): CPT

## 2017-06-03 PROCEDURE — 99285 EMERGENCY DEPT VISIT HI MDM: CPT | Mod: 25

## 2017-06-03 PROCEDURE — 84100 ASSAY OF PHOSPHORUS: CPT

## 2017-06-03 PROCEDURE — 81003 URINALYSIS AUTO W/O SCOPE: CPT

## 2017-06-03 PROCEDURE — 86900 BLOOD TYPING SEROLOGIC ABO: CPT

## 2017-06-03 PROCEDURE — 86850 RBC ANTIBODY SCREEN: CPT

## 2017-06-03 PROCEDURE — 85610 PROTHROMBIN TIME: CPT

## 2017-06-03 RX ORDER — ACETAMINOPHEN 500 MG
1000 TABLET ORAL ONCE
Qty: 0 | Refills: 0 | Status: COMPLETED | OUTPATIENT
Start: 2017-06-03 | End: 2017-06-03

## 2017-06-03 RX ORDER — LIDOCAINE 4 G/100G
1 CREAM TOPICAL ONCE
Qty: 0 | Refills: 0 | Status: COMPLETED | OUTPATIENT
Start: 2017-06-03 | End: 2017-06-03

## 2017-06-03 RX ORDER — TAMSULOSIN HYDROCHLORIDE 0.4 MG/1
0.4 CAPSULE ORAL DAILY
Qty: 0 | Refills: 0 | Status: DISCONTINUED | OUTPATIENT
Start: 2017-06-03 | End: 2017-06-03

## 2017-06-03 RX ORDER — ACETAMINOPHEN WITH CODEINE 300MG-30MG
1 TABLET ORAL
Qty: 20 | Refills: 0 | OUTPATIENT
Start: 2017-06-03 | End: 2017-06-08

## 2017-06-03 RX ORDER — ACETAMINOPHEN WITH CODEINE 300MG-30MG
1 TABLET ORAL EVERY 4 HOURS
Qty: 0 | Refills: 0 | Status: DISCONTINUED | OUTPATIENT
Start: 2017-06-03 | End: 2017-06-03

## 2017-06-03 RX ADMIN — TAMSULOSIN HYDROCHLORIDE 0.4 MILLIGRAM(S): 0.4 CAPSULE ORAL at 10:04

## 2017-06-03 RX ADMIN — Medication 1000 MILLIGRAM(S): at 02:28

## 2017-06-03 RX ADMIN — Medication 400 MILLIGRAM(S): at 01:58

## 2017-06-03 RX ADMIN — LIDOCAINE 1 APPLICATION(S): 4 CREAM TOPICAL at 02:38

## 2017-06-03 RX ADMIN — Medication 1 TABLET(S): at 11:25

## 2017-06-08 LAB — SURGICAL PATHOLOGY STUDY: SIGNIFICANT CHANGE UP

## 2017-06-14 ENCOUNTER — APPOINTMENT (OUTPATIENT)
Dept: SURGERY | Facility: CLINIC | Age: 32
End: 2017-06-14

## 2017-06-14 VITALS
SYSTOLIC BLOOD PRESSURE: 108 MMHG | TEMPERATURE: 98.5 F | DIASTOLIC BLOOD PRESSURE: 77 MMHG | OXYGEN SATURATION: 100 % | RESPIRATION RATE: 16 BRPM | HEART RATE: 90 BPM

## 2017-06-14 DIAGNOSIS — K64.3 FOURTH DEGREE HEMORRHOIDS: ICD-10-CM

## 2017-06-14 RX ORDER — ASPIRIN 325 MG
TABLET ORAL
Refills: 0 | Status: ACTIVE | COMMUNITY

## 2017-06-14 RX ORDER — MAGNESIUM HYDROXIDE 400 MG/5ML
400 SUSPENSION ORAL
Refills: 0 | Status: ACTIVE | COMMUNITY

## 2017-06-14 RX ORDER — PSYLLIUM SEED
58.6 PACKET (EA) ORAL
Refills: 0 | Status: ACTIVE | COMMUNITY

## 2017-06-14 RX ORDER — HYDROCORTISONE ACETATE 30 MG/1
30 SUPPOSITORY RECTAL
Refills: 0 | Status: DISCONTINUED | COMMUNITY

## 2017-06-28 RX ORDER — OMEPRAZOLE 10 MG/1
1 CAPSULE, DELAYED RELEASE ORAL
Qty: 0 | Refills: 0 | COMMUNITY

## 2017-06-28 RX ORDER — SENNA PLUS 8.6 MG/1
0 TABLET ORAL
Qty: 0 | Refills: 0 | COMMUNITY

## 2017-06-28 RX ORDER — DOCUSATE SODIUM 100 MG
0 CAPSULE ORAL
Qty: 0 | Refills: 0 | COMMUNITY

## 2017-07-05 ENCOUNTER — APPOINTMENT (OUTPATIENT)
Dept: GASTROENTEROLOGY | Facility: CLINIC | Age: 32
End: 2017-07-05

## 2017-07-05 ENCOUNTER — RX RENEWAL (OUTPATIENT)
Age: 32
End: 2017-07-05

## 2017-07-05 RX ORDER — SODIUM SULFATE, POTASSIUM SULFATE, MAGNESIUM SULFATE 17.5; 3.13; 1.6 G/ML; G/ML; G/ML
17.5-3.13-1.6 SOLUTION, CONCENTRATE ORAL
Qty: 1 | Refills: 0 | Status: ACTIVE | COMMUNITY
Start: 2017-07-05 | End: 1900-01-01

## 2017-07-13 ENCOUNTER — OTHER (OUTPATIENT)
Age: 32
End: 2017-07-13

## 2017-07-19 NOTE — H&P ADULT. - ALLERGIC/IMMUNOLOGIC
How Severe Are Your Spot(S)?: mild Have Your Spot(S) Been Treated In The Past?: has not been treated Hpi Title: Evaluation of Skin Lesions Location: chin Year Removed: 1900 negative

## 2017-07-26 ENCOUNTER — APPOINTMENT (OUTPATIENT)
Dept: SURGERY | Facility: CLINIC | Age: 32
End: 2017-07-26

## 2017-07-26 DIAGNOSIS — Z87.898 PERSONAL HISTORY OF OTHER SPECIFIED CONDITIONS: ICD-10-CM

## 2017-08-16 ENCOUNTER — APPOINTMENT (OUTPATIENT)
Dept: SURGERY | Facility: CLINIC | Age: 32
End: 2017-08-16

## 2019-10-19 ENCOUNTER — APPOINTMENT (OUTPATIENT)
Dept: PEDIATRICS | Facility: CLINIC | Age: 34
End: 2019-10-19
Payer: SELF-PAY

## 2019-10-19 DIAGNOSIS — Z23 ENCOUNTER FOR IMMUNIZATION: ICD-10-CM

## 2019-10-19 PROBLEM — K21.9 GASTRO-ESOPHAGEAL REFLUX DISEASE WITHOUT ESOPHAGITIS: Chronic | Status: ACTIVE | Noted: 2017-06-01

## 2019-10-19 PROBLEM — I86.8 VARICOSE VEINS OF OTHER SPECIFIED SITES: Chronic | Status: ACTIVE | Noted: 2017-06-01

## 2019-10-19 PROBLEM — K64.9 UNSPECIFIED HEMORRHOIDS: Chronic | Status: ACTIVE | Noted: 2017-06-01

## 2019-10-19 PROCEDURE — 90471 IMMUNIZATION ADMIN: CPT

## 2019-10-19 PROCEDURE — 90686 IIV4 VACC NO PRSV 0.5 ML IM: CPT

## 2019-10-19 PROCEDURE — 90472 IMMUNIZATION ADMIN EACH ADD: CPT

## 2019-10-19 PROCEDURE — 90715 TDAP VACCINE 7 YRS/> IM: CPT

## 2020-09-26 ENCOUNTER — TRANSCRIPTION ENCOUNTER (OUTPATIENT)
Age: 35
End: 2020-09-26

## 2022-01-21 ENCOUNTER — TRANSCRIPTION ENCOUNTER (OUTPATIENT)
Age: 37
End: 2022-01-21

## 2023-04-04 NOTE — ED PROVIDER NOTE - TEMPLATE, MLM
Topical Retinoid Pregnancy And Lactation Text: This medication is Pregnancy Category C. It is unknown if this medication is excreted in breast milk. Allergic Rx General

## 2023-07-20 ENCOUNTER — APPOINTMENT (OUTPATIENT)
Dept: ORTHOPEDIC SURGERY | Facility: CLINIC | Age: 38
End: 2023-07-20
Payer: COMMERCIAL

## 2023-07-20 VITALS — HEIGHT: 67 IN | WEIGHT: 215 LBS | BODY MASS INDEX: 33.74 KG/M2

## 2023-07-20 DIAGNOSIS — M79.18 MYALGIA, OTHER SITE: ICD-10-CM

## 2023-07-20 DIAGNOSIS — M70.41 PREPATELLAR BURSITIS, RIGHT KNEE: ICD-10-CM

## 2023-07-20 PROCEDURE — L1833: CPT | Mod: RT

## 2023-07-20 PROCEDURE — 99204 OFFICE O/P NEW MOD 45 MIN: CPT | Mod: 25

## 2023-07-20 PROCEDURE — 73564 X-RAY EXAM KNEE 4 OR MORE: CPT | Mod: RT

## 2023-07-20 PROCEDURE — 20610 DRAIN/INJ JOINT/BURSA W/O US: CPT | Mod: RT

## 2023-07-20 PROCEDURE — L2397: CPT | Mod: RT

## 2023-07-20 RX ORDER — NAPROXEN 500 MG/1
500 TABLET ORAL TWICE DAILY
Qty: 60 | Refills: 0 | Status: ACTIVE | COMMUNITY
Start: 2023-07-20 | End: 1900-01-01

## 2023-07-20 NOTE — IMAGING

## 2023-07-20 NOTE — PROCEDURE
[FreeTextEntry3] : Aspiration Procedure Note:\par \par The risks, benefits, and alternatives to aspiration were reviewed with the patient.  Risks outlined include but are not limited to infection, sepsis, bleeding, temporary increase in pain, syncopal episode, failure to resolve symptoms, and symptoms recurrence. Patient understood the risks and asked to proceed with this treatment course.\par \par Patient Identification\par Name/: Verbal with patient and/or family\par \par Procedure Verification:\par Procedure confirmed with patient or family/designee\par Consent for procedure: Verbal Consent Given\par Relevant documentation completed, reviewed, and signed\par Clinical indications for procedure confirmed\par \par Time-out with all members of procedure team immediately prior to procedure:\par Correct patient identified. Agreement on procedure. Correct side and site.\par \par KNEE ASPIRATION - RIGHT\par After verbal consent and identification of the correct patient and correct site, the prepatella bursa right knee was prepped using alcohol swabs and betadine. This was allowed time to air dry. \par 10 cc of yellow fluid was aspirated from the knee using a sterile 18G needle after ethyl chloride spray for skin anesthesia. The patient tolerated the procedure well. After-care instructions were provided and included instructions to ice the area and to call if redness, pain, or fever develop.

## 2023-07-20 NOTE — HISTORY OF PRESENT ILLNESS
[de-identified] : 38 year old male  (RHD, construction)  chronic right knee pain worsening since 7/10/23\par The pain is located  anterior, deep, medial \par The pain is associated with swelling, clicking \par Worse with activity and better at rest.\par Has tried ice, activity mod\par H/O ACL meniscus sx in [place] approx 2003

## 2023-07-20 NOTE — ASSESSMENT
[FreeTextEntry1] : RightX-Ray Examination of the KNEE (4 views): there are no fractures, subluxations or dislocations. metal screw in lateral femoral cortex. Mild PF degenerative changes. \par \par \par - The patient was advised of the diagnosis.  The natural history of the pathology was explained to the patient in layman's terms.  Several different treatment options were discussed and explained including the risks and benefits of both surgical and non-surgical treatments.  All questions and concerns were answered. \par - The patient was advised to apply ice (wrapped in a towel or protective covering) to the area daily (20 minutes at a time, 2-4X/day). \par - The patient was provided with a prescription for Physical Therapy. \par - The patient was advised to let pain guide the gradual advancement of activities. \par - Playmaker brace \par - Naprosyn rx\par - Patient was given a prescription for an anti-inflammatory medication.  They will take it for the next week and then on an as needed basis, as long as there are no medical contra-indications.  Patient is counseled on possible GI, renal, and cardiovascular side effects. \par - R knee asp - karson well \par - follow up as needed \par

## 2024-01-27 ENCOUNTER — NON-APPOINTMENT (OUTPATIENT)
Age: 39
End: 2024-01-27

## 2024-01-27 ENCOUNTER — APPOINTMENT (OUTPATIENT)
Dept: ORTHOPEDIC SURGERY | Facility: CLINIC | Age: 39
End: 2024-01-27
Payer: COMMERCIAL

## 2024-01-27 VITALS — HEIGHT: 67 IN | WEIGHT: 215 LBS | BODY MASS INDEX: 33.74 KG/M2

## 2024-01-27 DIAGNOSIS — S01.01XA LACERATION W/OUT FOREIGN BODY OF SCALP, INITIAL ENCOUNTER: ICD-10-CM

## 2024-01-27 PROCEDURE — 15853 REMOVAL SUTR/STAPL XREQ ANES: CPT

## 2024-01-27 PROCEDURE — 99204 OFFICE O/P NEW MOD 45 MIN: CPT

## 2025-05-05 ENCOUNTER — APPOINTMENT (OUTPATIENT)
Dept: PAIN MANAGEMENT | Facility: CLINIC | Age: 40
End: 2025-05-05
Payer: COMMERCIAL

## 2025-05-05 VITALS — HEIGHT: 67 IN | BODY MASS INDEX: 35.63 KG/M2 | WEIGHT: 227 LBS

## 2025-05-05 DIAGNOSIS — M54.50 LOW BACK PAIN, UNSPECIFIED: ICD-10-CM

## 2025-05-05 DIAGNOSIS — M79.18 MYALGIA, OTHER SITE: ICD-10-CM

## 2025-05-05 PROCEDURE — 99204 OFFICE O/P NEW MOD 45 MIN: CPT | Mod: 25

## 2025-05-05 PROCEDURE — 20552 NJX 1/MLT TRIGGER POINT 1/2: CPT

## 2025-05-05 PROCEDURE — J3490M: CUSTOM

## 2025-05-14 ENCOUNTER — APPOINTMENT (OUTPATIENT)
Dept: MRI IMAGING | Facility: CLINIC | Age: 40
End: 2025-05-14
Payer: COMMERCIAL

## 2025-05-14 PROCEDURE — 72148 MRI LUMBAR SPINE W/O DYE: CPT

## 2025-05-19 ENCOUNTER — APPOINTMENT (OUTPATIENT)
Dept: PAIN MANAGEMENT | Facility: CLINIC | Age: 40
End: 2025-05-19

## 2025-06-14 ENCOUNTER — APPOINTMENT (OUTPATIENT)
Dept: ORTHOPEDIC SURGERY | Facility: CLINIC | Age: 40
End: 2025-06-14

## 2025-06-14 PROCEDURE — 99215 OFFICE O/P EST HI 40 MIN: CPT

## 2025-06-17 ENCOUNTER — NON-APPOINTMENT (OUTPATIENT)
Age: 40
End: 2025-06-17

## 2025-08-11 ENCOUNTER — APPOINTMENT (OUTPATIENT)
Dept: ORTHOPEDIC SURGERY | Facility: CLINIC | Age: 40
End: 2025-08-11
Payer: COMMERCIAL

## 2025-08-11 VITALS — HEIGHT: 67 IN | WEIGHT: 227 LBS | BODY MASS INDEX: 35.63 KG/M2

## 2025-08-11 DIAGNOSIS — M41.50 OTHER SECONDARY SCOLIOSIS, SITE UNSPECIFIED: ICD-10-CM

## 2025-08-11 DIAGNOSIS — M51.360: ICD-10-CM

## 2025-08-11 PROCEDURE — 99214 OFFICE O/P EST MOD 30 MIN: CPT
